# Patient Record
Sex: MALE
[De-identification: names, ages, dates, MRNs, and addresses within clinical notes are randomized per-mention and may not be internally consistent; named-entity substitution may affect disease eponyms.]

---

## 2020-12-11 ENCOUNTER — HOSPITAL ENCOUNTER (EMERGENCY)
Dept: HOSPITAL 5 - ED | Age: 40
Discharge: HOME | End: 2020-12-11
Payer: SELF-PAY

## 2020-12-11 VITALS — SYSTOLIC BLOOD PRESSURE: 110 MMHG | DIASTOLIC BLOOD PRESSURE: 68 MMHG

## 2020-12-11 DIAGNOSIS — K62.5: Primary | ICD-10-CM

## 2020-12-11 LAB
ALBUMIN SERPL-MCNC: 4 G/DL (ref 3.9–5)
ALT SERPL-CCNC: 16 UNITS/L (ref 7–56)
BASOPHILS # (AUTO): 0.1 K/MM3 (ref 0–0.1)
BASOPHILS NFR BLD AUTO: 1.2 % (ref 0–1.8)
BILIRUB DIRECT SERPL-MCNC: < 0.2 MG/DL (ref 0–0.2)
BUN SERPL-MCNC: 12 MG/DL (ref 9–20)
BUN/CREAT SERPL: 12 %
CALCIUM SERPL-MCNC: 9 MG/DL (ref 8.4–10.2)
EOSINOPHIL # BLD AUTO: 0.2 K/MM3 (ref 0–0.4)
EOSINOPHIL NFR BLD AUTO: 3.4 % (ref 0–4.3)
HCT VFR BLD CALC: 41.2 % (ref 35.5–45.6)
HEMOLYSIS INDEX: 23
HGB BLD-MCNC: 14.1 GM/DL (ref 11.8–15.2)
INR PPP: 1.07 (ref 0.87–1.13)
LYMPHOCYTES # BLD AUTO: 1.6 K/MM3 (ref 1.2–5.4)
LYMPHOCYTES NFR BLD AUTO: 29.4 % (ref 13.4–35)
MCHC RBC AUTO-ENTMCNC: 34 % (ref 32–34)
MCV RBC AUTO: 88 FL (ref 84–94)
MONOCYTES # (AUTO): 0.5 K/MM3 (ref 0–0.8)
MONOCYTES % (AUTO): 9.9 % (ref 0–7.3)
PLATELET # BLD: 172 K/MM3 (ref 140–440)
RBC # BLD AUTO: 4.68 M/MM3 (ref 3.65–5.03)

## 2020-12-11 PROCEDURE — 80048 BASIC METABOLIC PNL TOTAL CA: CPT

## 2020-12-11 PROCEDURE — 99283 EMERGENCY DEPT VISIT LOW MDM: CPT

## 2020-12-11 PROCEDURE — 83690 ASSAY OF LIPASE: CPT

## 2020-12-11 PROCEDURE — 80076 HEPATIC FUNCTION PANEL: CPT

## 2020-12-11 PROCEDURE — 85610 PROTHROMBIN TIME: CPT

## 2020-12-11 PROCEDURE — 85025 COMPLETE CBC W/AUTO DIFF WBC: CPT

## 2020-12-11 PROCEDURE — 36415 COLL VENOUS BLD VENIPUNCTURE: CPT

## 2020-12-11 NOTE — EMERGENCY DEPARTMENT REPORT
ED GI Bleed HPI





- General


Chief complaint: GI Bleed


Stated complaint: RECTAL BLEEDING


Time Seen by Provider: 12/11/20 09:28


Source: patient


Mode of arrival: Ambulatory


Limitations: No Limitations





- History of Present Illness


Initial comments: 





Patient is a 40-year-old male with history of peptic ulcer disease who presents 

emergency department for evaluation of 6 months of painless bright red blood per

rectum in the mornings.  Patient states he has not followed up as he does not 

have health insurance.  Patient notes that symptoms seem to be worse when he 

takes nonsteroidal anti-inflammatory drugs for 2-day, however, does not complain

of tooth ache at present time.  Patient states he is compliant with daily 

famotidine.  Patient denies abdominal pain, denies nausea vomiting diarrhea, 

denies anticoagulation.  Patient denies vomiting blood patient.  Patient denies 

weakness or lightheadedness.





- Related Data


                                  Previous Rx's











 Medication  Instructions  Recorded  Last Taken  Type


 


Naproxen [Naprosyn] 500 mg PO BID PRN #20 tablet 06/20/15 Unknown Rx


 


Ondansetron [Zofran Odt] 4 mg PO Q6H PRN #20 tab.rapdis 06/20/15 Unknown Rx


 


Tamsulosin [Flomax] 0.4 mg PO QHS #30 cap 06/20/15 Unknown Rx


 


oxyCODONE /ACETAMINOPHEN [Percocet 1 tab PO Q6HR PRN #15 tablet 06/20/15 Unknown

 Rx





5/325]    











                                    Allergies











Allergy/AdvReac Type Severity Reaction Status Date / Time


 


No Known Allergies Allergy   Verified 06/20/15 18:58














ED Review of Systems


ROS: 


Stated complaint: RECTAL BLEEDING


Other details as noted in HPI





Comment: All other systems reviewed and negative





ED Past Medical Hx





- Past Medical History


Previous Medical History?: No


Additional medical history: Peptic ulcer disease





- Surgical History


Past Surgical History?: No





- Social History


Smoking Status: Current Every Day Smoker


Substance Use Type: None





- Medications


Home Medications: 


                                Home Medications











 Medication  Instructions  Recorded  Confirmed  Last Taken  Type


 


Naproxen [Naprosyn] 500 mg PO BID PRN #20 tablet 06/20/15  Unknown Rx


 


Ondansetron [Zofran Odt] 4 mg PO Q6H PRN #20 tab.rapdis 06/20/15  Unknown Rx


 


Tamsulosin [Flomax] 0.4 mg PO QHS #30 cap 06/20/15  Unknown Rx


 


oxyCODONE /ACETAMINOPHEN [Percocet 1 tab PO Q6HR PRN #15 tablet 06/20/15  

Unknown Rx





5/325]     














ED Physical Exam





- General


Limitations: No Limitations


General appearance: alert, in no apparent distress





- Head


Head exam: Present: atraumatic, normocephalic





- Eye


Eye exam: Present: normal appearance





- ENT


ENT exam: Present: mucous membranes moist





- Neck


Neck exam: Present: normal inspection





- Respiratory


Respiratory exam: Present: normal lung sounds bilaterally.  Absent: respiratory 

distress





- Cardiovascular


Cardiovascular Exam: Present: regular rate, normal rhythm.  Absent: systolic 

murmur, diastolic murmur, rubs, gallop





- GI/Abdominal


GI/Abdominal exam: Present: soft, normal bowel sounds





- Rectal


Rectal exam: Present: deferred





- Extremities Exam


Extremities exam: Present: normal inspection





- Back Exam


Back exam: Present: normal inspection





- Neurological Exam


Neurological exam: Present: alert, oriented X3





- Psychiatric


Psychiatric exam: Present: normal affect, normal mood





- Skin


Skin exam: Present: warm, dry, intact, normal color.  Absent: rash





ED Course





                                   Vital Signs











  12/11/20 12/11/20 12/11/20





  09:35 09:45 10:00


 


Pulse Rate 70 67 64


 


Respiratory 22 21 21





Rate   


 


Blood Pressure  112/62 110/68














- Reevaluation(s)


Reevaluation #1: 





12/11/20 10:22


Patient advised to discontinue Nostril anti-inflammatory drugs for tooth ache or

any source of pain and instead take Tylenol.  Discussed at length the importance

of follow-up for eventual colonoscopy to exclude mass versus polyp versus AV 

malformation versus peptic ulcer disease versus other undiagnosed 

gastroenterologic pathology causing bleeding.  Given reassuring hemoglobin, 

absence of anticoagulation, chronicity of complaint, patient does not require 

emergent gastroenterologic evaluation.  Patient given information for [facility] [place] [facility], and advised to follow-up with Jose for possible 

gastroenterology clinic information if unable to follow-up with either of the 

first 2 options.  In the absence of any available follow-up or for any worsening

symptoms,  Patient is advised to follow-up with our emergency department 

immediately.





ED Medical Decision Making





- Lab Data


Result diagrams: 


                                 12/11/20 09:20





                                 12/11/20 09:20








                                   Lab Results











  12/11/20 12/11/20 Range/Units





  09:20 09:20 


 


WBC  5.3   (4.5-11.0)  K/mm3


 


RBC  4.68   (3.65-5.03)  M/mm3


 


Hgb  14.1   (11.8-15.2)  gm/dl


 


Hct  41.2   (35.5-45.6)  %


 


MCV  88   (84-94)  fl


 


MCH  30   (28-32)  pg


 


MCHC  34   (32-34)  %


 


RDW  13.7   (13.2-15.2)  %


 


Plt Count  172   (140-440)  K/mm3


 


Lymph % (Auto)  29.4   (13.4-35.0)  %


 


Mono % (Auto)  9.9 H   (0.0-7.3)  %


 


Eos % (Auto)  3.4   (0.0-4.3)  %


 


Baso % (Auto)  1.2   (0.0-1.8)  %


 


Lymph # (Auto)  1.6   (1.2-5.4)  K/mm3


 


Mono # (Auto)  0.5   (0.0-0.8)  K/mm3


 


Eos # (Auto)  0.2   (0.0-0.4)  K/mm3


 


Baso # (Auto)  0.1   (0.0-0.1)  K/mm3


 


Seg Neutrophils %  56.1   (40.0-70.0)  %


 


Seg Neutrophils #  3.0   (1.8-7.7)  K/mm3


 


Sodium   138  (137-145)  mmol/L


 


Potassium   4.4  (3.6-5.0)  mmol/L


 


Chloride   106.3  ()  mmol/L


 


Carbon Dioxide   25  (22-30)  mmol/L


 


Anion Gap   11  mmol/L


 


BUN   12  (9-20)  mg/dL


 


Creatinine   1.0  (0.8-1.3)  mg/dL


 


Estimated GFR   > 60  ml/min


 


BUN/Creatinine Ratio   12  %


 


Glucose   110 H  ()  mg/dL


 


Calcium   9.0  (8.4-10.2)  mg/dL











                                        











Temp Pulse Resp BP Pulse Ox


 


    64   21   110/68    


 


    12/11/20 10:00  12/11/20 10:00  12/11/20 10:00   











                                   Vital Signs











  12/11/20 12/11/20 12/11/20





  09:35 09:45 10:00


 


Pulse Rate 70 67 64


 


Respiratory 22 21 21





Rate   


 


Blood Pressure  112/62 110/68











Critical care attestation.: 


If time is entered above; I have spent that time in minutes in the direct care o

f this critically ill patient, excluding procedure time.








ED Disposition


Clinical Impression: 


 Rectal bleeding





Disposition: DC-01 TO HOME OR SELFCARE


Is pt being admited?: No


Does the pt Need Aspirin: No


Condition: Stable


Instructions:  Rectal Bleeding


Referrals: 


PRIMARY CARE,MD [Primary Care Provider] - 3-5 Days


[facility] [Provider Group] - 3-5 Days


[facility] [Provider Group] - 3-5 Days

## 2021-09-04 ENCOUNTER — HOSPITAL ENCOUNTER (EMERGENCY)
Dept: HOSPITAL 5 - ED | Age: 41
LOS: 1 days | Discharge: LEFT BEFORE DISCHARGE | End: 2021-09-05
Payer: SELF-PAY

## 2021-09-04 DIAGNOSIS — I95.9: ICD-10-CM

## 2021-09-04 DIAGNOSIS — Z79.899: ICD-10-CM

## 2021-09-04 DIAGNOSIS — K92.0: ICD-10-CM

## 2021-09-04 DIAGNOSIS — K62.5: Primary | ICD-10-CM

## 2021-09-04 DIAGNOSIS — F17.200: ICD-10-CM

## 2021-09-04 LAB
ALBUMIN SERPL-MCNC: 4 G/DL (ref 3.9–5)
ALT SERPL-CCNC: 9 UNITS/L (ref 7–56)
APTT BLD: 23.8 SEC. (ref 24.2–36.6)
BASOPHILS # (AUTO): 0 K/MM3 (ref 0–0.1)
BASOPHILS NFR BLD AUTO: 0.2 % (ref 0–1.8)
BUN SERPL-MCNC: 23 MG/DL (ref 9–20)
BUN/CREAT SERPL: 21 %
CALCIUM SERPL-MCNC: 8 MG/DL (ref 8.4–10.2)
EOSINOPHIL # BLD AUTO: 0 K/MM3 (ref 0–0.4)
EOSINOPHIL NFR BLD AUTO: 0.1 % (ref 0–4.3)
HCT VFR BLD CALC: 36.9 % (ref 35.5–45.6)
HEMOLYSIS INDEX: 3
HGB BLD-MCNC: 12.8 GM/DL (ref 11.8–15.2)
INR PPP: 1.13 (ref 0.87–1.13)
LYMPHOCYTES # BLD AUTO: 1.5 K/MM3 (ref 1.2–5.4)
LYMPHOCYTES NFR BLD AUTO: 11.9 % (ref 13.4–35)
MCHC RBC AUTO-ENTMCNC: 35 % (ref 32–34)
MCV RBC AUTO: 88 FL (ref 84–94)
MONOCYTES # (AUTO): 0.8 K/MM3 (ref 0–0.8)
MONOCYTES % (AUTO): 6.4 % (ref 0–7.3)
PLATELET # BLD: 282 K/MM3 (ref 140–440)
RBC # BLD AUTO: 4.21 M/MM3 (ref 3.65–5.03)

## 2021-09-04 PROCEDURE — 85610 PROTHROMBIN TIME: CPT

## 2021-09-04 PROCEDURE — 82140 ASSAY OF AMMONIA: CPT

## 2021-09-04 PROCEDURE — 85730 THROMBOPLASTIN TIME PARTIAL: CPT

## 2021-09-04 PROCEDURE — 80053 COMPREHEN METABOLIC PANEL: CPT

## 2021-09-04 PROCEDURE — 36415 COLL VENOUS BLD VENIPUNCTURE: CPT

## 2021-09-04 PROCEDURE — 85025 COMPLETE CBC W/AUTO DIFF WBC: CPT

## 2021-09-04 NOTE — EMERGENCY DEPARTMENT REPORT
ED GI Bleed HPI





- General


Chief complaint: GI Bleed


Stated complaint: BLEEDING ULCER


Time Seen by Provider: 09/04/21 23:02


Source: patient, EMS


Mode of arrival: Stretcher


Limitations: No Limitations





- History of Present Illness


Initial comments: 





Patient is a 40-year-old male who presents emergency room with complaints of GI 

bleed.  Patient states he has had nausea and vomiting for 3 days.  Patient 

states he also developed coffee-ground emesis 3 days ago.  Patient states she 

has not been able to hold any food down.  Patient states approximately 2 days 

ago he developed bright red blood per rectum.  Patient states he is having 

multiple bowel movements of pure blood.  Patient denies pain.  Patient states 

his stool is diarrhea and blood.  Patient denies fever or chills.  Patient 

denies abdominal pain.  Patient denies dysuria.








Patient denies recent travel.  Patient denies recent international travel.  

Patient denies exposure to the novel coronavirus.  Patient denies sick contacts.

 Patient denies fever and chills.  Patient denies cough.   Patient denies coming

in contact with anybody with symptoms of the novel coronavirus.





Patient is with EMS.  Report received from EMS.  EMS states that while the 

patient has been waiting in the room they have taken to the patient to the 

restroom 3 times and has had large gross hematochezia.  EMS states that the 

patient's initial blood pressure was 90/60.  Patient also found to have 

tachycardia.  EMS states that while the patient is lying flat the patient's 

blood pressure and heart rate improved.  EMS states once the patient gets up to 

the restroom his heart rate increases and if blood pressure drops.  Patient is 

currently receiving IV saline.











MD complaint: coffee ground emesis, gross hematochezia


-: Sudden


Consistency: constant


Improves with: rest


Worsens with: eating, bowel movement, movement


Context: history of GI bleed


Associated Symptoms: nausea, vomiting.  denies: abdominal pain, epistaxis, 

fever/chills, headaches, loss of appetite, malaise, easy bruising, rash, 

shortness of breath, syncope, weakness


Treatments Prior to Arrival: none





- Related Data


                                  Previous Rx's











 Medication  Instructions  Recorded  Last Taken  Type


 


Naproxen [Naprosyn] 500 mg PO BID PRN #20 tablet 06/20/15 Unknown Rx


 


Ondansetron [Zofran Odt] 4 mg PO Q6H PRN #20 tab.rapdis 06/20/15 Unknown Rx


 


Tamsulosin [Flomax] 0.4 mg PO QHS #30 cap 06/20/15 Unknown Rx


 


oxyCODONE /ACETAMINOPHEN [Percocet 1 tab PO Q6HR PRN #15 tablet 06/20/15 Unknown

 Rx





5/325]    











                                    Allergies











Allergy/AdvReac Type Severity Reaction Status Date / Time


 


No Known Allergies Allergy   Verified 06/20/15 18:58














ED Review of Systems


ROS: 


Stated complaint: BLEEDING ULCER


Other details as noted in HPI





Constitutional: denies: chills, fever


Eyes: denies: eye pain, eye discharge, vision change


ENT: denies: ear pain, throat pain


Respiratory: denies: cough, shortness of breath, wheezing


Cardiovascular: denies: chest pain, palpitations


Endocrine: no symptoms reported


Gastrointestinal: nausea, vomiting, diarrhea, hematemesis, hematochezia.  

denies: abdominal pain


Genitourinary: denies: urgency, dysuria


Musculoskeletal: denies: back pain, joint swelling, arthralgia


Skin: denies: rash, lesions


Neurological: denies: headache, weakness, paresthesias


Psychiatric: denies: anxiety, depression


Hematological/Lymphatic: denies: easy bleeding, easy bruising





ED Past Medical Hx





- Past Medical History


Previous Medical History?: Yes


Additional medical history: Peptic ulcer disease





- Surgical History


Past Surgical History?: No





- Family History


Family history: no significant





- Social History


Smoking Status: Current Every Day Smoker


Substance Use Type: None





- Medications


Home Medications: 


                                Home Medications











 Medication  Instructions  Recorded  Confirmed  Last Taken  Type


 


Naproxen [Naprosyn] 500 mg PO BID PRN #20 tablet 06/20/15  Unknown Rx


 


Ondansetron [Zofran Odt] 4 mg PO Q6H PRN #20 tab.rapdis 06/20/15  Unknown Rx


 


Tamsulosin [Flomax] 0.4 mg PO QHS #30 cap 06/20/15  Unknown Rx


 


oxyCODONE /ACETAMINOPHEN [Percocet 1 tab PO Q6HR PRN #15 tablet 06/20/15  

Unknown Rx





5/325]     














ED Physical Exam





- General


Limitations: No Limitations


General appearance: alert, in no apparent distress





- Head


Head exam: Present: atraumatic, normocephalic





- Eye


Eye exam: Present: normal appearance





- ENT


ENT exam: Present: mucous membranes moist





- Neck


Neck exam: Present: normal inspection





- Respiratory


Respiratory exam: Present: normal lung sounds bilaterally.  Absent: respiratory 

distress





- Cardiovascular


Cardiovascular Exam: Present: regular rate, normal rhythm.  Absent: systolic 

murmur, diastolic murmur, rubs, gallop





- GI/Abdominal


GI/Abdominal exam: Present: soft, normal bowel sounds.  Absent: distended, 

tenderness, guarding





- Rectal


Rectal exam: Present: deferred





- Extremities Exam


Extremities exam: Present: normal inspection





- Back Exam


Back exam: Present: normal inspection





- Neurological Exam


Neurological exam: Present: alert, oriented X3





- Psychiatric


Psychiatric exam: Present: normal affect, normal mood





- Skin


Skin exam: Present: warm, dry, intact, normal color.  Absent: rash





ED Course





- Reevaluation(s)


Reevaluation #1: 


I discussed plan of care with patient.  Patient states he does not want to stay 

in the hospital longer.  Patient states he wants to go.  I discussed the risk 

with patient.  Patient states he wants to leave.  Patient voiced understanding 

of risk of leaving the hospital AGAINST MEDICAL ADVICE.  Patient signed AMA 

form.  Patient is alert and oriented x4.


09/05/21 00:15








- Consultations


Consultation #1: 


GI paged.


09/05/21 00:10





GI informed the patient left AGAINST MEDICAL ADVICE.


09/05/21 00:17








ED Medical Decision Making





- Lab Data


Result diagrams: 


                                 09/04/21 23:22





                                 09/04/21 23:22





- Medical Decision Making





Patient is a 40-year-old male who presents emergency with complaints of coffee-

ground emesis, GI bleed, and bright red blood per rectum.  Patient has history 

of GI bleed.  Patient states his melena for 3 days.  He states symptoms are 

worsening.  Patient is having large volume gross hematochezia.  Patient 

medications witnessed by EMS.  While patient is waiting patient had 3 large 

bloody diarrheas in the ER.  Patient had labs done which were essentially 

unremarkable.  Patient GI for consultation based on the patient's clinical 

presentation.  However prior to being able discussed the case with GI, the 

patient left the hospital AGAINST MEDICAL ADVICE.  Patient signed AMA form.  

Patient given a formal discharge even though he left the hospital AGAINST 

MEDICAL ADVICE.





- Differential Diagnosis


GI bleed, bright red blood per rectum, coffee-ground emesis


Critical Care Time: Yes


Critical care time in (mins) excluding proc time.: 35


Critical care attestation.: 


If time is entered above; I have spent that time in minutes in the direct care 

of this critically ill patient, excluding procedure time.





Critical Care Time: 





35 minutes











ED Disposition


Clinical Impression: 


 BRBPR (bright red blood per rectum), Coffee ground emesis





Nausea & vomiting


Qualifiers:


 Vomiting type: unspecified Vomiting Intractability: non-intractable Qualified 

Code(s): R11.2 - Nausea with vomiting, unspecified





Hypotension


Qualifiers:


 Hypotension type: unspecified hypotension type Qualified Code(s): I95.9 - 

Hypotension, unspecified





Disposition: 07 LEFT AGAINST MEDICAL ADVICE


Is pt being admited?: No


Does the pt Need Aspirin: No


Condition: Critical


Instructions:  Nausea and Vomiting, Adult, Easy-to-Read, Hematemesis, Lower 

Gastrointestinal Bleeding


Additional Instructions: 


Patient to follow-up with primary care in 2 to 3 days.  Patient to follow-up 

with gastroenterologist in 2 to 3 days.  Patient to rest.  Patient to increase 

water.  Patient to avoid strenuous exercise or heavy lifting until cleared by GI

and primary care.  Patient to take Tylenol as needed for pain.    Patient to 

return to the ER if condition worsens, changes or new symptoms arise.


Forms:  AMA Form


Time of Disposition: 00:23

## 2021-09-05 VITALS — SYSTOLIC BLOOD PRESSURE: 110 MMHG | DIASTOLIC BLOOD PRESSURE: 70 MMHG

## 2021-11-02 ENCOUNTER — HOSPITAL ENCOUNTER (EMERGENCY)
Dept: HOSPITAL 5 - ED | Age: 41
Discharge: LEFT BEFORE DISCHARGE | End: 2021-11-02
Payer: SELF-PAY

## 2021-11-02 DIAGNOSIS — R11.2: Primary | ICD-10-CM

## 2021-11-02 DIAGNOSIS — F17.200: ICD-10-CM

## 2021-11-02 DIAGNOSIS — R74.8: ICD-10-CM

## 2021-11-02 DIAGNOSIS — R10.84: ICD-10-CM

## 2021-11-02 LAB
ALBUMIN SERPL-MCNC: 4.2 G/DL (ref 3.9–5)
ALT SERPL-CCNC: 8 UNITS/L (ref 7–56)
BASOPHILS # (AUTO): 0.1 K/MM3 (ref 0–0.1)
BASOPHILS NFR BLD AUTO: 1 % (ref 0–1.8)
BILIRUB DIRECT SERPL-MCNC: < 0.2 MG/DL (ref 0–0.2)
BUN SERPL-MCNC: 7 MG/DL (ref 9–20)
BUN/CREAT SERPL: 8 %
CALCIUM SERPL-MCNC: 8.6 MG/DL (ref 8.4–10.2)
EOSINOPHIL # BLD AUTO: 0.1 K/MM3 (ref 0–0.4)
EOSINOPHIL NFR BLD AUTO: 0.9 % (ref 0–4.3)
HCT VFR BLD CALC: 36.7 % (ref 35.5–45.6)
HEMOLYSIS INDEX: 3
HGB BLD-MCNC: 12.1 GM/DL (ref 11.8–15.2)
LYMPHOCYTES # BLD AUTO: 1.6 K/MM3 (ref 1.2–5.4)
LYMPHOCYTES NFR BLD AUTO: 19.5 % (ref 13.4–35)
MCHC RBC AUTO-ENTMCNC: 33 % (ref 32–34)
MCV RBC AUTO: 78 FL (ref 84–94)
MONOCYTES # (AUTO): 0.8 K/MM3 (ref 0–0.8)
MONOCYTES % (AUTO): 10 % (ref 0–7.3)
PLATELET # BLD: 306 K/MM3 (ref 140–440)
RBC # BLD AUTO: 4.68 M/MM3 (ref 3.65–5.03)

## 2021-11-02 PROCEDURE — 96361 HYDRATE IV INFUSION ADD-ON: CPT

## 2021-11-02 PROCEDURE — 99284 EMERGENCY DEPT VISIT MOD MDM: CPT

## 2021-11-02 PROCEDURE — 80048 BASIC METABOLIC PNL TOTAL CA: CPT

## 2021-11-02 PROCEDURE — 85025 COMPLETE CBC W/AUTO DIFF WBC: CPT

## 2021-11-02 PROCEDURE — 80076 HEPATIC FUNCTION PANEL: CPT

## 2021-11-02 PROCEDURE — 96374 THER/PROPH/DIAG INJ IV PUSH: CPT

## 2021-11-02 PROCEDURE — 83690 ASSAY OF LIPASE: CPT

## 2021-11-02 PROCEDURE — 36415 COLL VENOUS BLD VENIPUNCTURE: CPT

## 2021-11-02 PROCEDURE — 74177 CT ABD & PELVIS W/CONTRAST: CPT

## 2021-11-02 NOTE — CAT SCAN REPORT
CT ABDOMEN AND PELVIS WITH CONTRAST



HISTORY: Abd pain, elevated lipase OMNI 300 100 ML



COMPARISON: None



TECHNIQUE: Routine abdominal and pelvic CT exam performed  following intravenous contrast administrat
ion.. All CT scans at this location are performed using CT dose reduction for ALARA by means of autom
ated exposure control.



FINDINGS:



CT ABDOMEN:

Lung Bases: No significant abnormality.

Liver: Small 1.5 cm hypoattenuating lesion in the right hepatic lobe. Internal density measurement of
 50 Hounsfield units.

Biliary: No significant abnormality.

Spleen: No significant abnormality.  Unenlarged.

Pancreas: No significant abnormality.

Adrenals: No significant abnormality.

Kidneys: Small simple cysts in the lateral right kidney.

Lymphatics: No lymphadenopathy.

Vasculature: No significant abnormality. 

Bowel/Peritoneum: No significant abnormality. No free air. No free fluid.



CT PELVIC:

: No significant abnormality.

Lymphatics: No lymphadenopathy.



Osseous Structures: No aggressive appearing osseous lesions.

Additional Findings: None



IMPRESSION:

1. No acute findings. Specifically, the pancreas appears normal.

2. Indeterminate hypoattenuating lesion in the right hepatic lobe. If there is no prior imaging to ev
aluate for stability, evaluation with liver ultrasound can be performed to evaluate for any solid com
ponent or internal complexity. (This does not appear to represent a simple cyst on CT).



Signer Name: Tomas Jose MD 

Signed: 11/2/2021 1:06 PM

Workstation Name: [id number]

## 2021-11-02 NOTE — EMERGENCY DEPARTMENT REPORT
HPI





- General


Chief Complaint: Abdominal Pain


Time Seen by Provider: 11/02/21 10:24





- HPI


HPI: 





41-year-old  male presents to the emergency department with a complaint

of a 1.5-week history of intractable nausea with vomiting.  This is associated 

with generalized abdominal pain.  He says it is currently about 5 out of 10 in 

intensity.  No known aggravating or alleviating factors.  He denies any fever, 

diarrhea, chest pain, shortness of breath.  The patient says that this has been 

going on since he was recently incarcerated.  He has a past medical history of 

"bleeding ulcers" but denies any rectal bleeding or bloody emesis.  He has not 

taken anything for symptoms prior to presentation.  No recent travel.  He is not

vaccinated against COVID-19, but denies any known exposure to anyone positive 

for COVID-19.  Patient claims to have lost about 40 pounds over the past 2 

months without intending to do so.





ED Past Medical Hx





- Past Medical History


Additional medical history: Peptic ulcer disease





- Social History


Smoking Status: Current Every Day Smoker


Substance Use Type: None





- Medications


Home Medications: 


                                Home Medications











 Medication  Instructions  Recorded  Confirmed  Last Taken  Type


 


Naproxen [Naprosyn] 500 mg PO BID PRN #20 tablet 06/20/15  Unknown Rx


 


Ondansetron [Zofran Odt] 4 mg PO Q6H PRN #20 tab.rapdis 06/20/15  Unknown Rx


 


Tamsulosin [Flomax] 0.4 mg PO QHS #30 cap 06/20/15  Unknown Rx


 


oxyCODONE /ACETAMINOPHEN [Percocet 1 tab PO Q6HR PRN #15 tablet 06/20/15  

Unknown Rx





5/325]     














ED Review of Systems


ROS: 


Stated complaint: CANT HOLD LIQUIDS DOWN,


Other details as noted in HPI





Comment: All other systems reviewed and negative


Constitutional: denies: chills, fever


Eyes: denies: eye pain, vision change


ENT: denies: ear pain, throat pain


Respiratory: denies: cough, shortness of breath


Cardiovascular: denies: chest pain, palpitations


Gastrointestinal: abdominal pain, nausea, vomiting


Genitourinary: denies: dysuria, discharge


Musculoskeletal: denies: back pain, arthralgia


Skin: denies: rash, lesions


Neurological: denies: headache, weakness





Physical Exam





- Physical Exam


Vital Signs: 


                                   Vital Signs











  11/02/21





  09:29


 


Temperature 97.8 F


 


Pulse Rate 78


 


Respiratory 18





Rate 


 


O2 Sat by Pulse 97





Oximetry 











Physical Exam: 





GENERAL: The patient is well-developed well-nourished.


HENT: Normocephalic.  Atraumatic.    Patient has moist mucous membranes.


EYES: Extraocular motions are intact.


NECK: Supple. Trachea is midline.


CHEST/LUNGS: Clear to auscultation.  There is no respiratory distress noted.


HEART/CARDIOVASCULAR: Regular.  There is no tachycardia.  There is no murmur.


ABDOMEN: Abdomen is soft.  Generalized abdominal tenderness to palpation.  No 

guarding.  Patient has normal bowel sounds.  There is no abdominal distention.


SKIN: Skin is warm and dry. 


NEURO: The patient is awake, alert, and oriented.  The patient is cooperative.  

The patient has no focal neurologic deficits.  Normal speech.


MUSCULOSKELETAL: There is no tenderness or deformity.  There is no limitation 

range of motion.





ED Course


                                   Vital Signs











  11/02/21





  09:29


 


Temperature 97.8 F


 


Pulse Rate 78


 


Respiratory 18





Rate 


 


O2 Sat by Pulse 97





Oximetry 














ED Medical Decision Making





- Lab Data


Result diagrams: 


                                 11/02/21 10:35





                                 11/02/21 10:35





                                   Lab Results











  11/02/21 11/02/21 Range/Units





  10:35 10:35 


 


WBC  8.1   (4.5-11.0)  K/mm3


 


RBC  4.68   (3.65-5.03)  M/mm3


 


Hgb  12.1   (11.8-15.2)  gm/dl


 


Hct  36.7   (35.5-45.6)  %


 


MCV  78 L   (84-94)  fl


 


MCH  26 L   (28-32)  pg


 


MCHC  33   (32-34)  %


 


RDW  16.3 H   (13.2-15.2)  %


 


Plt Count  306   (140-440)  K/mm3


 


Lymph % (Auto)  19.5   (13.4-35.0)  %


 


Mono % (Auto)  10.0 H   (0.0-7.3)  %


 


Eos % (Auto)  0.9   (0.0-4.3)  %


 


Baso % (Auto)  1.0   (0.0-1.8)  %


 


Lymph # (Auto)  1.6   (1.2-5.4)  K/mm3


 


Mono # (Auto)  0.8   (0.0-0.8)  K/mm3


 


Eos # (Auto)  0.1   (0.0-0.4)  K/mm3


 


Baso # (Auto)  0.1   (0.0-0.1)  K/mm3


 


Seg Neutrophils %  68.6   (40.0-70.0)  %


 


Seg Neutrophils #  5.6   (1.8-7.7)  K/mm3


 


Sodium   132 L  (137-145)  mmol/L


 


Potassium   3.9  (3.6-5.0)  mmol/L


 


Chloride   98.1  ()  mmol/L


 


Carbon Dioxide   24  (22-30)  mmol/L


 


Anion Gap   14  mmol/L


 


BUN   7 L  (9-20)  mg/dL


 


Creatinine   0.9  (0.8-1.3)  mg/dL


 


Estimated GFR   > 60  ml/min


 


BUN/Creatinine Ratio   8  %


 


Glucose   123 H  ()  mg/dL


 


Calcium   8.6  (8.4-10.2)  mg/dL


 


Total Bilirubin   0.20  (0.1-1.2)  mg/dL


 


Direct Bilirubin   < 0.2  (0-0.2)  mg/dL


 


Indirect Bilirubin   0.0  mg/dL


 


AST   9  (5-40)  units/L


 


ALT   8  (7-56)  units/L


 


Alkaline Phosphatase   57  ()  units/L


 


Total Protein   6.6  (6.3-8.2)  g/dL


 


Albumin   4.2  (3.9-5)  g/dL


 


Albumin/Globulin Ratio   1.8  %


 


Lipase   278 H  (13-60)  units/L














- Radiology Data


Radiology results: report reviewed





CT ABDOMEN AND PELVIS WITH CONTRAST HISTORY: Abd pain, elevated lipase OMNI 300 

100 ML COMPARISON: None TECHNIQUE: Routine abdominal and pelvic CT exam 

performed following intravenous contrast administration.. All CT scans at this 

location are performed using CT dose reduction for ALARA by means of automated 

exposure control. FINDINGS: CT ABDOMEN: Lung Bases: No significant abnormality. 

Liver: Small 1.5 cm hypoattenuating lesion in the right hepatic lobe. Internal 

density measurement of 50 Hounsfield units. Biliary: No significant abnormality.

Spleen: No significant abnormality. Unenlarged. Pancreas: No significant 

abnormality. Adrenals: No significant abnormality. Kidneys: Small simple cysts 

in the lateral right kidney. Lymphatics: No lymphadenopathy. Vasculature: No 

significant abnormality. Bowel/Peritoneum: No significant abnormality. No free 

air. No free fluid. CT PELVIC: : No significant abnormality. Lymphatics: No 

lymphadenopathy. Osseous Structures: No aggressive appearing osseous lesions. 

Additional Findings: None IMPRESSION: 1. No acute findings. Specifically, the 

pancreas appears normal. 2. Indeterminate hypoattenuating lesion in the right 

hepatic lobe. If there is no prior imaging to evaluate for stability, evaluation

with liver ultrasound can be performed to evaluate for any solid component or 

internal complexity. (This does not appear to represent a simple cyst on CT). 





- Medical Decision Making





This patient presents to the emergency department with a complaint of a 1.5-week

history of nausea with vomiting and generalized abdominal pain.  On examination 

there is some reproducible abdominal tenderness to palpation but no guarding and

the abdomen is not distended.  Labs are mostly unremarkable including CBC and 

metabolic panel but he does have an elevated lipase level of about 300.  Patient

was given IV fluid resuscitation and antiemetic.  He went for CT scan of the 

abdomen and pelvis which did not show any evidence of pancreatitis but did show 

a hypoattenuation of the liver that may require future imaging.  However, I went

to reevaluate the patient after CT and medications and the patient had eloped 

from the emergency department.


Critical Care Time: No


Critical care attestation.: 


If time is entered above; I have spent that time in minutes in the direct care 

of this critically ill patient, excluding procedure time.








ED Disposition


Clinical Impression: 


 Abdominal pain, Nausea & vomiting, Elevated lipase





Disposition: 07 LEFT AWOL/ELOPED


Is pt being admited?: No


Time of Disposition: 14:43

## 2021-11-04 ENCOUNTER — HOSPITAL ENCOUNTER (EMERGENCY)
Dept: HOSPITAL 5 - ED | Age: 41
Discharge: LEFT BEFORE DISCHARGE | End: 2021-11-04
Payer: SELF-PAY

## 2021-11-04 VITALS — DIASTOLIC BLOOD PRESSURE: 74 MMHG | SYSTOLIC BLOOD PRESSURE: 118 MMHG

## 2021-11-04 DIAGNOSIS — Z53.21: ICD-10-CM

## 2021-11-04 DIAGNOSIS — R11.10: Primary | ICD-10-CM

## 2021-11-04 NOTE — EVENT NOTE
Date: 11/04/21





Went to room to examine patient.  Patient not in room.  Patient not in the 

department.  He has not told anyone where he is.


Called up listed phone number.  No answer.  Left voicemail for call back with 

instructions to return to the emergency room

## 2022-02-09 ENCOUNTER — HOSPITAL ENCOUNTER (EMERGENCY)
Dept: HOSPITAL 5 - ED | Age: 42
Discharge: LEFT BEFORE DISCHARGE | End: 2022-02-09
Payer: SELF-PAY

## 2022-02-09 VITALS — SYSTOLIC BLOOD PRESSURE: 129 MMHG | DIASTOLIC BLOOD PRESSURE: 86 MMHG

## 2022-02-09 DIAGNOSIS — R69: Primary | ICD-10-CM

## 2022-02-09 DIAGNOSIS — Z53.21: ICD-10-CM

## 2022-04-18 ENCOUNTER — HOSPITAL ENCOUNTER (EMERGENCY)
Dept: HOSPITAL 5 - ED | Age: 42
Discharge: LEFT BEFORE DISCHARGE | End: 2022-04-18
Payer: SELF-PAY

## 2022-04-18 VITALS — SYSTOLIC BLOOD PRESSURE: 142 MMHG | DIASTOLIC BLOOD PRESSURE: 60 MMHG

## 2022-04-18 DIAGNOSIS — Z53.21: ICD-10-CM

## 2022-04-18 DIAGNOSIS — R11.10: Primary | ICD-10-CM

## 2022-09-30 ENCOUNTER — HOSPITAL ENCOUNTER (EMERGENCY)
Dept: HOSPITAL 5 - ED | Age: 42
Discharge: LEFT BEFORE DISCHARGE | End: 2022-09-30
Payer: SELF-PAY

## 2022-09-30 VITALS — DIASTOLIC BLOOD PRESSURE: 68 MMHG | SYSTOLIC BLOOD PRESSURE: 102 MMHG

## 2022-09-30 DIAGNOSIS — M25.569: Primary | ICD-10-CM

## 2022-09-30 DIAGNOSIS — Z53.21: ICD-10-CM

## 2022-09-30 DIAGNOSIS — Z13.30: ICD-10-CM
